# Patient Record
Sex: FEMALE | Race: WHITE | URBAN - METROPOLITAN AREA
[De-identification: names, ages, dates, MRNs, and addresses within clinical notes are randomized per-mention and may not be internally consistent; named-entity substitution may affect disease eponyms.]

---

## 2022-10-31 ENCOUNTER — OFFICE VISIT (OUTPATIENT)
Dept: OBGYN CLINIC | Facility: CLINIC | Age: 52
End: 2022-10-31
Payer: COMMERCIAL

## 2022-10-31 VITALS — WEIGHT: 135 LBS | SYSTOLIC BLOOD PRESSURE: 106 MMHG | DIASTOLIC BLOOD PRESSURE: 67 MMHG

## 2022-10-31 DIAGNOSIS — Z01.419 WOMEN'S ANNUAL ROUTINE GYNECOLOGICAL EXAMINATION: Primary | ICD-10-CM

## 2022-10-31 DIAGNOSIS — N95.1 PERIMENOPAUSE: ICD-10-CM

## 2022-10-31 DIAGNOSIS — N95.1 MENOPAUSAL SYNDROME (HOT FLASHES): ICD-10-CM

## 2022-10-31 PROCEDURE — 99386 PREV VISIT NEW AGE 40-64: CPT | Performed by: OBSTETRICS & GYNECOLOGY

## 2022-10-31 PROCEDURE — 99212 OFFICE O/P EST SF 10 MIN: CPT | Performed by: OBSTETRICS & GYNECOLOGY

## 2022-10-31 PROCEDURE — 3074F SYST BP LT 130 MM HG: CPT | Performed by: OBSTETRICS & GYNECOLOGY

## 2022-10-31 PROCEDURE — 3078F DIAST BP <80 MM HG: CPT | Performed by: OBSTETRICS & GYNECOLOGY

## 2022-10-31 RX ORDER — IRON,CARBONYL/ASCORBIC ACID 100-250 MG
TABLET ORAL AS DIRECTED
COMMUNITY

## 2022-10-31 NOTE — PROGRESS NOTES
HPI:    Patient ID: Magan Johnson is a 54year old year old female. HPI  New patient  Well woman visit  17-year-old  1 para 0 last menstrual period 2022. History complex right ovarian cyst and submucous fibroid. Saw gynecologist 10/27/21 and then had follow-up ultrasound which showed resolution of the cyst.  She had an [facility] level done in October which was 99 and estradiol level was very low. Ca1 25 was normal.  She had a screening mammogram this year in March which was normal.  She states that she has been going to the same place for years and will continue to do so. She has had a history of cysts stable benign right breast calcifications. She had removal of a basal cell carcinoma of the skin of the anterior chest years ago. Years ago she had a history of heavy menses was treated with oral contraceptives maybe 5 years ago and then stopped. She had no problems with her menses since then. Her obstetric history significant for a vaginal delivery and she needed a cerclage during that regnancy but delivered uneventfully. Patient states that she contracted COVID about a month ago and then noted 2 small right genital ulcers that went away on their own after a week. She had never had them there. There is no presence of them on pelvic exam today. Review of Systems   Constitutional: Negative. Cardiovascular: Negative. Gastrointestinal: Negative. Genitourinary: Negative. Skin: Negative. Neurological: Negative. Psychiatric/Behavioral: Negative. All other systems reviewed and are negative. No current outpatient medications on file. Physical Exam     Vitals: /67   Wt 135 lb (61.2 kg)   LMP 2022   Neck: Supple and without masses. No cervical adenopathy. Breasts: Symmetric. Skin without lesions. No masses. Areolae are normal.  Nipples without discharge. No axillary or supra cervical adenopathy    Abdominal: Soft.  Normal appearance and bowel sounds are normal. She exhibits no mass. There is no hepatosplenomegaly. There is no tenderness. There is no rebound and no CVA tenderness. No hernia. Hernia negative in the ventral area,  negative in the right inguinal area and negative in the left inguinal area. Genitourinary:   Pelvic exam was performed with patient supine and chaperone present. External genitalia- normal.  [place]'s and [place]'s glands normal.  Urethral meatus- without lesions, mass, or discharge. Urethra- normal without lesion, cyst, mass, or tenderness. Vulva- normal.  Labia majora and minora without lesions. Vagina- normal, no lesions or discharge. Moist and well supported. Bladder-  nontender. No masses. Normal support. No evidence of cystocele,  abnormal bladder neck mobility or evident urinary incontinence. Cervix- smooth, normal epithelium without lesions or discharge. No motion tenderness. Uterus- normal size, shape, and contour. Nontender. No masses. Adnexa-  Nontender, no masses. Perineum- normal without lesions  Anus-  Normal appearing without lesions. ASSESSMENT/PLAN:    (Z01.419) Women's annual routine gynecological examination  (primary encounter diagnosis)  Plan: THINPREP PAP WITH HPV REFLEX REQUEST B        Normal exam.  Pap/HPV co-test every 3 years when previous normal/-HPV. Monthly self breast exam.  Annual screening mammograms. Contraception discussed as needed. Screening colonoscopy at age 50, earlier if indicated. Recommend regular exercise and quality diet. Return to clinic in 6 mos or as needed. Perimenopausal since LMP in June. Had a light menses in January. Discussed hot flashes and remedies such as Estroven, soy, omega-3 fatty acids. Return in 6 months. Counseled that if menses are gone for 12 months that it defines menopause.   To notify for abnormal periods that are heavy or intermenstrual bleeding or coital bleeding.    (Z12.31) Screening mammogram for breast cancer  Plan: CA LEGGETT 2D+3D SCREENING BILAT         (CPT=77067/87153)              No orders of the defined types were placed in this encounter. No outpatient encounter medications on file as of 10/31/2022. No facility-administered encounter medications on file as of 10/31/2022.

## 2022-11-03 ENCOUNTER — TELEPHONE (OUTPATIENT)
Dept: OBGYN CLINIC | Facility: CLINIC | Age: 52
End: 2022-11-03

## 2022-11-03 NOTE — TELEPHONE ENCOUNTER
[employer] message sent to pt.    ----- Message from Lisa Coreas MD sent at 11/3/2022  3:05 PM CDT -----  Please notify by MyChart or letter of normal Pap test.  We are awaiting the HPV cotest result.

## 2022-11-03 NOTE — TELEPHONE ENCOUNTER
Contacted [facility] and informed PAP test was ordered as reflex with HPV. Pap was normal so HPV was not reflexed. HPV cotest added with [facility]. Routing as FYI.

## 2022-11-04 LAB
HPV MRNA E6/E7: NOT DETECTED
TEST CODE:: NORMAL

## 2023-03-14 ENCOUNTER — TELEPHONE (OUTPATIENT)
Dept: OBGYN CLINIC | Facility: CLINIC | Age: 53
End: 2023-03-14

## 2023-03-14 NOTE — TELEPHONE ENCOUNTER
Please inform patient that we received reporting from [facility] breast center imaging which reported her mammogram to be stable with advice to do monthly self breast exam and annual screening routine mammograms.

## 2023-06-19 ENCOUNTER — OFFICE VISIT (OUTPATIENT)
Dept: OBGYN CLINIC | Facility: CLINIC | Age: 53
End: 2023-06-19

## 2023-06-19 VITALS
BODY MASS INDEX: 20.56 KG/M2 | DIASTOLIC BLOOD PRESSURE: 79 MMHG | WEIGHT: 131 LBS | SYSTOLIC BLOOD PRESSURE: 127 MMHG | HEIGHT: 67 IN

## 2023-06-19 DIAGNOSIS — Z78.0 MENOPAUSE: Primary | ICD-10-CM

## 2023-06-19 PROCEDURE — 3078F DIAST BP <80 MM HG: CPT | Performed by: OBSTETRICS & GYNECOLOGY

## 2023-06-19 PROCEDURE — 3008F BODY MASS INDEX DOCD: CPT | Performed by: OBSTETRICS & GYNECOLOGY

## 2023-06-19 PROCEDURE — 3074F SYST BP LT 130 MM HG: CPT | Performed by: OBSTETRICS & GYNECOLOGY

## 2023-06-19 PROCEDURE — 99213 OFFICE O/P EST LOW 20 MIN: CPT | Performed by: OBSTETRICS & GYNECOLOGY

## 2024-09-26 ENCOUNTER — OFFICE VISIT (OUTPATIENT)
Dept: OBGYN CLINIC | Facility: CLINIC | Age: 54
End: 2024-09-26
Payer: COMMERCIAL

## 2024-09-26 VITALS
BODY MASS INDEX: 21.69 KG/M2 | HEIGHT: 66 IN | DIASTOLIC BLOOD PRESSURE: 80 MMHG | SYSTOLIC BLOOD PRESSURE: 122 MMHG | WEIGHT: 135 LBS

## 2024-09-26 DIAGNOSIS — Z12.31 ENCOUNTER FOR SCREENING MAMMOGRAM FOR BREAST CANCER: ICD-10-CM

## 2024-09-26 DIAGNOSIS — Z01.419 ENCOUNTER FOR WELL WOMAN EXAM WITH ROUTINE GYNECOLOGICAL EXAM: Primary | ICD-10-CM

## 2024-09-26 PROCEDURE — 99396 PREV VISIT EST AGE 40-64: CPT | Performed by: OBSTETRICS & GYNECOLOGY

## 2024-09-26 NOTE — PROGRESS NOTES
HPI:    Patient ID: Kristin Hendrix is a 53 year old year old female.    HPI  Well woman visit  53-year-old  1 para 1 menopausal female  Last menstrual period 2 years ago.  She feels great!  She is very pleased.  She is active and sexual relations are great with lubricant use.  Denies any breast or pelvic problems.  She has her mammogram ordered and scheduled elsewhere.  She will follow-up with the results.  Review of Systems   Constitutional: Negative.    Cardiovascular: Negative.    Gastrointestinal: Negative.    Genitourinary: Negative.    Skin: Negative.    Neurological: Negative.    Psychiatric/Behavioral: Negative.     All other systems reviewed and are negative.       Current Outpatient Medications   Medication Sig Dispense Refill    Multiple Vitamins-Minerals (MULTIVITAMIN ADULT EXTRA C OR) As Directed.         No past medical history on file.    No past surgical history on file.    Family History   Problem Relation Age of Onset    Hypertension Mother        Social History     Socioeconomic History    Marital status:      Spouse name: Not on file    Number of children: Not on file    Years of education: Not on file    Highest education level: Not on file   Occupational History    Not on file   Tobacco Use    Smoking status: Never    Smokeless tobacco: Never   Substance and Sexual Activity    Alcohol use: Not on file     Comment: social    Drug use: Never    Sexual activity: Not on file   Other Topics Concern    Not on file   Social History Narrative    Not on file     Social Determinants of Health     Financial Resource Strain: Not on file   Food Insecurity: Not on file   Transportation Needs: Not on file   Physical Activity: Not on file   Stress: Not on file   Social Connections: Not on file   Housing Stability: Not on file       Physical Exam     Vitals: /80   Ht 5' 6\" (1.676 m)   Wt 135 lb (61.2 kg)   BMI 21.79 kg/m²     Constitutional: She appears well-developed and well-nourished.      Musculoskeletal: Normal range of motion of upper and lower extremities.   Neurological: She is alert and oriented x 3.   Skin: Skin is warm without pallor.  Psychiatric: Her behavior is normal. Judgment normal.  Able to communicate verbally.    HEENT:  EOMI.  SANCHEZ.  Sclera anicteric.    Head: Normocephalic.  Normal hair distribution.  No lesions.  Neck: Normal range of motion.      Adenopathy:  No supraclavicular or cervical adenopathy.  Thyroid:  Normal size, shape, and position.  No masses, tenderness, or nodules.  Cardiovascular: Normal rate and regular rhythm.    Pulmonary/Chest: Effort normal.   Abdominal: Soft. Normal appearance and bowel sounds are normal. She exhibits no mass. There is no hepatosplenomegaly. There is no tenderness. There is no rebound and no CVA tenderness. No hernia. Hernia negative in the ventral area,  negative in the right inguinal area and negative in the left inguinal area.   Lymphadenopathy:        Right: No inguinal adenopathy present.        Left: No inguinal adenopathy present.     Breasts:    Symmetric bilaterally.  Areolas without lesions.  Skin- normal without growths, lesions, erythema or peau d'orange change.  Nipples- without retraction or discharge.  No masses, lumps, skin changes, erythema, or lesions.  Axilla-  No adenopathy, mass, or tenderness.    Genitourinary:   Pelvic exam was performed with patient supine and chaperone present.  External genitalia- normal.  [place]'s and [place]'s glands normal.  Urethral meatus- without lesions, mass, or discharge.  Urethra- normal without lesion, cyst, mass, or tenderness.  Vulva- normal.  Labia majora and minora without lesions.   Vagina- normal, no lesions or discharge.  Moist and well supported.  Bladder-  nontender.  No masses.  Normal support.  No evidence of cystocele,  abnormal bladder neck mobility or evident urinary incontinence.  Cervix- smooth, normal epithelium without lesions or discharge.  No motion tenderness.    Uterus- normal size, shape, and contour.  Nontender.  No masses.  Adnexa-  Nontender, no masses.   Perineum- normal without lesions  Anus-  Normal appearing without lesions.    ASSESSMENT/PLAN:      ICD-10-CM    1. Encounter for well woman exam with routine gynecological exam  Z01.419 ThinPrep PAP Smear     Hpv Dna  High Risk , Thin Prep Collect      2. Encounter for screening mammogram for breast cancer  Z12.31       Normal exam.  Pap test every 3 years if prior normal/-HPV.  Monthly self breast exam.  Annual screening mammograms.  Recommend screening colonoscopy at age 50, or as advised by GI.  Recommend dexascan for osteoporosis screening as indicated.  Recommend regular exercise and quality diet.  Return to clinic in one year or as needed.      Orders Placed This Encounter    Hpv Dna  High Risk , Thin Prep Collect     Standing Status:   Future     Standing Expiration Date:   9/26/2025     Order Specific Question:   HPV Genotyping Request (if HPV positive):     Answer:   Yes [1]     Order Specific Question:   Release to patient     Answer:   Immediate       Outpatient Encounter Medications as of 9/26/2024   Medication Sig Dispense Refill    Multiple Vitamins-Minerals (MULTIVITAMIN ADULT EXTRA C OR) As Directed.      [DISCONTINUED] Iron-Vitamin C 100-250 MG Oral Tab As Directed.       No facility-administered encounter medications on file as of 9/26/2024.

## 2024-09-27 LAB — HPV E6+E7 MRNA CVX QL NAA+PROBE: NEGATIVE

## (undated) NOTE — LETTER
9/30/2024              Kristin Hendrix        [address] [facility] [address]         Dear Kristin,    It was a pleasure to see you.  Your PAP test was normal.  There is no need for further testing at this time.  I look forward to seeing you at your next scheduled appointment.      Sincerely,    Sukhjinder Villanueva MD  [address] [place] [id number]  Ph: [phone]  Fax: [phone]        Document electronically generated by:  Martha ROBERTS CMA

## (undated) NOTE — LETTER
3/14/2023              Kristin Hendrix        [address] [facility] [address] [facility]        [place] [address]         Dear Tawnya Vega,    Your mammogram was normal. There is no need for further testing at this time. The radiologist from [facility]note [facility] breast center recommends you do monthly self breast exams and a screening mammogram in one year. I look forward to seeing you at your next scheduled appointment.       Sincerely,    Viridiana Samson MD  [facility]  [address] [address] [phone]